# Patient Record
Sex: MALE | Race: WHITE | NOT HISPANIC OR LATINO | Employment: OTHER | URBAN - METROPOLITAN AREA
[De-identification: names, ages, dates, MRNs, and addresses within clinical notes are randomized per-mention and may not be internally consistent; named-entity substitution may affect disease eponyms.]

---

## 2018-06-04 ENCOUNTER — CONVERSION ENCOUNTER (OUTPATIENT)
Dept: PODIATRY | Facility: CLINIC | Age: 53
End: 2018-06-04

## 2018-06-04 ENCOUNTER — OFFICE VISIT CONVERTED (OUTPATIENT)
Dept: PODIATRY | Facility: CLINIC | Age: 53
End: 2018-06-04
Attending: PODIATRIST

## 2019-08-05 ENCOUNTER — CONVERSION ENCOUNTER (OUTPATIENT)
Dept: FAMILY MEDICINE CLINIC | Facility: CLINIC | Age: 54
End: 2019-08-05

## 2019-08-05 ENCOUNTER — OFFICE VISIT CONVERTED (OUTPATIENT)
Dept: FAMILY MEDICINE CLINIC | Facility: CLINIC | Age: 54
End: 2019-08-05
Attending: FAMILY MEDICINE

## 2019-08-05 ENCOUNTER — HOSPITAL ENCOUNTER (OUTPATIENT)
Dept: FAMILY MEDICINE CLINIC | Facility: CLINIC | Age: 54
Discharge: HOME | End: 2019-08-05
Attending: FAMILY MEDICINE

## 2019-08-05 LAB
ALBUMIN SERPL-MCNC: 3.7 G/DL (ref 3.5–5)
ALBUMIN/GLOB SERPL: 1.3 {RATIO} (ref 1.4–2.6)
ALP SERPL-CCNC: 99 U/L (ref 56–119)
ALT SERPL-CCNC: 26 U/L (ref 10–40)
ANION GAP SERPL CALC-SCNC: 17 MMOL/L (ref 8–19)
APPEARANCE UR: ABNORMAL
AST SERPL-CCNC: 39 U/L (ref 15–50)
BASOPHILS # BLD AUTO: 0.05 10*3/UL (ref 0–0.2)
BASOPHILS NFR BLD AUTO: 0.7 % (ref 0–3)
BILIRUB SERPL-MCNC: 2.29 MG/DL (ref 0.2–1.3)
BILIRUB UR QL: ABNORMAL
BUN SERPL-MCNC: 7 MG/DL (ref 5–25)
BUN/CREAT SERPL: 7 {RATIO} (ref 6–20)
CALCIUM SERPL-MCNC: 9.9 MG/DL (ref 8.7–10.4)
CASTS URNS QL MICRO: ABNORMAL /[LPF]
CHLORIDE SERPL-SCNC: 105 MMOL/L (ref 99–111)
COLOR UR: ABNORMAL
CONV ABS IMM GRAN: 0.02 10*3/UL (ref 0–0.2)
CONV CO2: 25 MMOL/L (ref 22–32)
CONV COLLECTION SOURCE (UA): ABNORMAL
CONV HYALINE CASTS IN URINE MICRO: ABNORMAL /[LPF]
CONV IMMATURE GRAN: 0.3 % (ref 0–1.8)
CONV TOTAL PROTEIN: 6.6 G/DL (ref 6.3–8.2)
CONV UROBILINOGEN IN URINE BY AUTOMATED TEST STRIP: 4 {EHRLICHU}/DL (ref 0.1–1)
CREAT UR-MCNC: 1.02 MG/DL (ref 0.7–1.2)
DEPRECATED RDW RBC AUTO: 55.4 FL (ref 35.1–43.9)
EOSINOPHIL # BLD AUTO: 0.1 10*3/UL (ref 0–0.7)
EOSINOPHIL # BLD AUTO: 1.5 % (ref 0–7)
ERYTHROCYTE [DISTWIDTH] IN BLOOD BY AUTOMATED COUNT: 14.3 % (ref 11.6–14.4)
FOLATE SERPL-MCNC: 5.9 NG/ML (ref 4.8–20)
GFR SERPLBLD BASED ON 1.73 SQ M-ARVRAT: >60 ML/MIN/{1.73_M2}
GLOBULIN UR ELPH-MCNC: 2.9 G/DL (ref 2–3.5)
GLUCOSE SERPL-MCNC: 82 MG/DL (ref 70–99)
GLUCOSE UR QL: NEGATIVE MG/DL
HCT VFR BLD AUTO: 41 % (ref 42–52)
HGB BLD-MCNC: 13.5 G/DL (ref 14–18)
HGB UR QL STRIP: ABNORMAL
IRON SATN MFR SERPL: 68 % (ref 20–55)
IRON SERPL-MCNC: 195 UG/DL (ref 70–180)
KETONES UR QL STRIP: NEGATIVE MG/DL
LEUKOCYTE ESTERASE UR QL STRIP: ABNORMAL
LYMPHOCYTES # BLD AUTO: 1.8 10*3/UL (ref 1–5)
LYMPHOCYTES NFR BLD AUTO: 26.5 % (ref 20–45)
MCH RBC QN AUTO: 34.6 PG (ref 27–31)
MCHC RBC AUTO-ENTMCNC: 32.9 G/DL (ref 33–37)
MCV RBC AUTO: 105.1 FL (ref 80–96)
MONOCYTES # BLD AUTO: 0.58 10*3/UL (ref 0.2–1.2)
MONOCYTES NFR BLD AUTO: 8.5 % (ref 3–10)
NEUTROPHILS # BLD AUTO: 4.25 10*3/UL (ref 2–8)
NEUTROPHILS NFR BLD AUTO: 62.5 % (ref 30–85)
NITRITE UR QL STRIP: POSITIVE
NRBC CBCN: 0 % (ref 0–0.7)
OSMOLALITY SERPL CALC.SUM OF ELEC: 293 MOSM/KG (ref 273–304)
PH UR STRIP.AUTO: 6 [PH] (ref 5–8)
PLATELET # BLD AUTO: 133 10*3/UL (ref 130–400)
PMV BLD AUTO: 11.1 FL (ref 9.4–12.4)
POTASSIUM SERPL-SCNC: 4.3 MMOL/L (ref 3.5–5.3)
PROT UR QL: NEGATIVE MG/DL
RBC # BLD AUTO: 3.9 10*6/UL (ref 4.7–6.1)
RBC #/AREA URNS HPF: ABNORMAL /[HPF]
SODIUM SERPL-SCNC: 143 MMOL/L (ref 135–147)
SP GR UR: 1.03 (ref 1–1.03)
T4 FREE SERPL-MCNC: 1.2 NG/DL (ref 0.9–1.8)
TIBC SERPL-MCNC: 287 UG/DL (ref 245–450)
TRANSFERRIN SERPL-MCNC: 201 MG/DL (ref 215–365)
TSH SERPL-ACNC: 0.69 M[IU]/L (ref 0.27–4.2)
VIT B12 SERPL-MCNC: 842 PG/ML (ref 211–911)
WBC # BLD AUTO: 6.8 10*3/UL (ref 4.8–10.8)
WBC #/AREA URNS HPF: ABNORMAL /[HPF]

## 2019-08-08 LAB
BACTERIA UR CULT: NORMAL
CONV EBV EARLY ANTIGEN: 26 U/ML (ref 0–10.9)
CONV EBV NUCLEAR ANTIGEN: >600 U/ML (ref 0–21.9)
CONV EPSTEIN BARR VIRAL CAPSID IGM: <10 U/ML (ref 0–43.9)
CONV EPSTEIN BARR VIRUS ANTIBODY TO VIRAL CAPSID IGG: 544 U/ML (ref 0–21.9)

## 2019-08-12 ENCOUNTER — HOSPITAL ENCOUNTER (OUTPATIENT)
Dept: FAMILY MEDICINE CLINIC | Facility: CLINIC | Age: 54
Discharge: HOME | End: 2019-08-12
Attending: FAMILY MEDICINE

## 2019-08-12 ENCOUNTER — CONVERSION ENCOUNTER (OUTPATIENT)
Dept: SURGERY | Facility: CLINIC | Age: 54
End: 2019-08-12

## 2019-08-12 ENCOUNTER — CONVERSION ENCOUNTER (OUTPATIENT)
Dept: FAMILY MEDICINE CLINIC | Facility: CLINIC | Age: 54
End: 2019-08-12

## 2019-08-12 ENCOUNTER — OFFICE VISIT CONVERTED (OUTPATIENT)
Dept: FAMILY MEDICINE CLINIC | Facility: CLINIC | Age: 54
End: 2019-08-12
Attending: FAMILY MEDICINE

## 2019-08-12 ENCOUNTER — OFFICE VISIT CONVERTED (OUTPATIENT)
Dept: SURGERY | Facility: CLINIC | Age: 54
End: 2019-08-12
Attending: PHYSICIAN ASSISTANT

## 2019-08-12 LAB
ALBUMIN SERPL-MCNC: 3.4 G/DL (ref 3.5–5)
ALBUMIN/GLOB SERPL: 1.1 {RATIO} (ref 1.4–2.6)
ALP SERPL-CCNC: 132 U/L (ref 56–119)
ALT SERPL-CCNC: 25 U/L (ref 10–40)
ANION GAP SERPL CALC-SCNC: 15 MMOL/L (ref 8–19)
AST SERPL-CCNC: 35 U/L (ref 15–50)
BILIRUB SERPL-MCNC: 1.66 MG/DL (ref 0.2–1.3)
BUN SERPL-MCNC: 7 MG/DL (ref 5–25)
BUN/CREAT SERPL: 7 {RATIO} (ref 6–20)
CALCIUM SERPL-MCNC: 10 MG/DL (ref 8.7–10.4)
CHLORIDE SERPL-SCNC: 108 MMOL/L (ref 99–111)
CONV CO2: 23 MMOL/L (ref 22–32)
CONV TOTAL PROTEIN: 6.4 G/DL (ref 6.3–8.2)
CREAT UR-MCNC: 1 MG/DL (ref 0.7–1.2)
GFR SERPLBLD BASED ON 1.73 SQ M-ARVRAT: >60 ML/MIN/{1.73_M2}
GLOBULIN UR ELPH-MCNC: 3 G/DL (ref 2–3.5)
GLUCOSE SERPL-MCNC: 73 MG/DL (ref 70–99)
IRON SATN MFR SERPL: 73 % (ref 20–55)
IRON SERPL-MCNC: 201 UG/DL (ref 70–180)
OSMOLALITY SERPL CALC.SUM OF ELEC: 289 MOSM/KG (ref 273–304)
POTASSIUM SERPL-SCNC: 5.1 MMOL/L (ref 3.5–5.3)
SODIUM SERPL-SCNC: 141 MMOL/L (ref 135–147)
TIBC SERPL-MCNC: 276 UG/DL (ref 245–450)
TRANSFERRIN SERPL-MCNC: 193 MG/DL (ref 215–365)

## 2019-08-15 ENCOUNTER — HOSPITAL ENCOUNTER (OUTPATIENT)
Dept: OTHER | Facility: HOSPITAL | Age: 54
Discharge: HOME | End: 2019-08-15
Attending: FAMILY MEDICINE

## 2019-08-27 ENCOUNTER — HOSPITAL ENCOUNTER (OUTPATIENT)
Dept: FAMILY MEDICINE CLINIC | Facility: CLINIC | Age: 54
Discharge: HOME | End: 2019-08-27
Attending: FAMILY MEDICINE

## 2019-08-27 ENCOUNTER — OFFICE VISIT CONVERTED (OUTPATIENT)
Dept: FAMILY MEDICINE CLINIC | Facility: CLINIC | Age: 54
End: 2019-08-27
Attending: FAMILY MEDICINE

## 2019-08-27 LAB
FERRITIN SERPL-MCNC: 236 NG/ML (ref 30–300)
IRON SATN MFR SERPL: 65 % (ref 20–55)
IRON SERPL-MCNC: 187 UG/DL (ref 70–180)
TIBC SERPL-MCNC: 289 UG/DL (ref 245–450)
TRANSFERRIN SERPL-MCNC: 202 MG/DL (ref 215–365)

## 2019-09-06 ENCOUNTER — PROCEDURE VISIT CONVERTED (OUTPATIENT)
Dept: UROLOGY | Facility: CLINIC | Age: 54
End: 2019-09-06
Attending: UROLOGY

## 2020-02-05 ENCOUNTER — HOSPITAL ENCOUNTER (OUTPATIENT)
Dept: OTHER | Facility: HOSPITAL | Age: 55
Discharge: HOME | End: 2020-02-05
Attending: INTERNAL MEDICINE

## 2020-03-13 ENCOUNTER — OFFICE VISIT CONVERTED (OUTPATIENT)
Dept: FAMILY MEDICINE CLINIC | Facility: CLINIC | Age: 55
End: 2020-03-13
Attending: NURSE PRACTITIONER

## 2020-07-02 ENCOUNTER — HOSPITAL ENCOUNTER (OUTPATIENT)
Dept: OTHER | Facility: HOSPITAL | Age: 55
Discharge: HOME | End: 2020-07-02
Attending: ORTHOPAEDIC SURGERY

## 2020-07-02 ENCOUNTER — OFFICE VISIT CONVERTED (OUTPATIENT)
Dept: ORTHOPEDIC SURGERY | Facility: CLINIC | Age: 55
End: 2020-07-02
Attending: ORTHOPAEDIC SURGERY

## 2020-07-02 LAB
APPEARANCE FLD: NORMAL
COLOR AMN: NORMAL
CRYSTALS FLD MICRO: NORMAL
LYMPHOCYTES NFR FLD MANUAL: 9 %
MACROPHAGE FLUID: 0 /100{WBCS}
MONOCYTES NFR FLD: 22 %
NEUTROPHILS NFR FLD MANUAL: 69 %
RBC # FLD AUTO: NORMAL /UL
SPECIMEN SOURCE: NORMAL
WBC # FLD AUTO: 100 /UL

## 2020-07-04 LAB — BACTERIA SPEC ANAEROBE CULT: NORMAL

## 2020-07-05 LAB — BACTERIA SPEC AEROBE CULT: NORMAL

## 2020-07-24 ENCOUNTER — HOSPITAL ENCOUNTER (OUTPATIENT)
Dept: URGENT CARE | Facility: CLINIC | Age: 55
Discharge: HOME | End: 2020-07-24

## 2020-07-30 ENCOUNTER — CONVERSION ENCOUNTER (OUTPATIENT)
Dept: ORTHOPEDIC SURGERY | Facility: CLINIC | Age: 55
End: 2020-07-30

## 2020-07-30 ENCOUNTER — OFFICE VISIT CONVERTED (OUTPATIENT)
Dept: ORTHOPEDIC SURGERY | Facility: CLINIC | Age: 55
End: 2020-07-30
Attending: PHYSICIAN ASSISTANT

## 2020-08-05 ENCOUNTER — HOSPITAL ENCOUNTER (OUTPATIENT)
Dept: OTHER | Facility: HOSPITAL | Age: 55
Discharge: HOME | End: 2020-08-05
Attending: PHYSICIAN ASSISTANT

## 2020-08-13 ENCOUNTER — OFFICE VISIT CONVERTED (OUTPATIENT)
Dept: ORTHOPEDIC SURGERY | Facility: CLINIC | Age: 55
End: 2020-08-13
Attending: ORTHOPAEDIC SURGERY

## 2020-08-19 ENCOUNTER — HOSPITAL ENCOUNTER (OUTPATIENT)
Dept: FAMILY MEDICINE CLINIC | Facility: CLINIC | Age: 55
Discharge: HOME | End: 2020-08-19
Attending: PHYSICIAN ASSISTANT

## 2020-08-21 LAB — SARS-COV-2 RNA SPEC QL NAA+PROBE: NOT DETECTED

## 2020-08-24 ENCOUNTER — HOSPITAL ENCOUNTER (OUTPATIENT)
Dept: PERIOP | Facility: HOSPITAL | Age: 55
Setting detail: HOSPITAL OUTPATIENT SURGERY
Discharge: HOME | End: 2020-08-24
Attending: ORTHOPAEDIC SURGERY

## 2020-08-24 LAB
ALBUMIN SERPL-MCNC: 3.4 G/DL (ref 3.5–5)
ALBUMIN/GLOB SERPL: 1.1 {RATIO} (ref 1.4–2.6)
ALP SERPL-CCNC: 119 U/L (ref 56–119)
ALT SERPL-CCNC: 19 U/L (ref 10–40)
ANION GAP SERPL CALC-SCNC: 11 MMOL/L (ref 8–19)
APTT BLD: 28.4 S (ref 22.2–34.2)
AST SERPL-CCNC: 34 U/L (ref 15–50)
BASOPHILS # BLD AUTO: 0.05 10*3/UL (ref 0–0.2)
BASOPHILS NFR BLD AUTO: 1.1 % (ref 0–3)
BILIRUB SERPL-MCNC: 1.72 MG/DL (ref 0.2–1.3)
BUN SERPL-MCNC: 7 MG/DL (ref 5–25)
BUN/CREAT SERPL: 6 {RATIO} (ref 6–20)
CALCIUM SERPL-MCNC: 10.3 MG/DL (ref 8.7–10.4)
CHLORIDE SERPL-SCNC: 107 MMOL/L (ref 99–111)
CONV ABS IMM GRAN: 0 10*3/UL (ref 0–0.2)
CONV CO2: 26 MMOL/L (ref 22–32)
CONV IMMATURE GRAN: 0 % (ref 0–1.8)
CONV TOTAL PROTEIN: 6.5 G/DL (ref 6.3–8.2)
CREAT UR-MCNC: 1.08 MG/DL (ref 0.7–1.2)
DEPRECATED RDW RBC AUTO: 49.8 FL (ref 35.1–43.9)
EOSINOPHIL # BLD AUTO: 0.27 10*3/UL (ref 0–0.7)
EOSINOPHIL # BLD AUTO: 5.7 % (ref 0–7)
ERYTHROCYTE [DISTWIDTH] IN BLOOD BY AUTOMATED COUNT: 13.7 % (ref 11.6–14.4)
GFR SERPLBLD BASED ON 1.73 SQ M-ARVRAT: >60 ML/MIN/{1.73_M2}
GLOBULIN UR ELPH-MCNC: 3.1 G/DL (ref 2–3.5)
GLUCOSE SERPL-MCNC: 88 MG/DL (ref 70–99)
HCT VFR BLD AUTO: 41.1 % (ref 42–52)
HGB BLD-MCNC: 14 G/DL (ref 14–18)
INR PPP: 1.16 (ref 2–3)
LYMPHOCYTES # BLD AUTO: 1.75 10*3/UL (ref 1–5)
LYMPHOCYTES NFR BLD AUTO: 36.8 % (ref 20–45)
MCH RBC QN AUTO: 33.8 PG (ref 27–31)
MCHC RBC AUTO-ENTMCNC: 34.1 G/DL (ref 33–37)
MCV RBC AUTO: 99.3 FL (ref 80–96)
MONOCYTES # BLD AUTO: 0.45 10*3/UL (ref 0.2–1.2)
MONOCYTES NFR BLD AUTO: 9.5 % (ref 3–10)
NEUTROPHILS # BLD AUTO: 2.24 10*3/UL (ref 2–8)
NEUTROPHILS NFR BLD AUTO: 46.9 % (ref 30–85)
NRBC CBCN: 0 % (ref 0–0.7)
OSMOLALITY SERPL CALC.SUM OF ELEC: 287 MOSM/KG (ref 273–304)
PLATELET # BLD AUTO: 108 10*3/UL (ref 130–400)
PMV BLD AUTO: 10.6 FL (ref 9.4–12.4)
POTASSIUM SERPL-SCNC: 4 MMOL/L (ref 3.5–5.3)
PROTHROMBIN TIME: 12.1 S (ref 9.4–12)
RBC # BLD AUTO: 4.14 10*6/UL (ref 4.7–6.1)
SODIUM SERPL-SCNC: 140 MMOL/L (ref 135–147)
WBC # BLD AUTO: 4.76 10*3/UL (ref 4.8–10.8)

## 2020-09-08 ENCOUNTER — OFFICE VISIT CONVERTED (OUTPATIENT)
Dept: ORTHOPEDIC SURGERY | Facility: CLINIC | Age: 55
End: 2020-09-08
Attending: PHYSICIAN ASSISTANT

## 2020-12-16 ENCOUNTER — OFFICE VISIT CONVERTED (OUTPATIENT)
Dept: FAMILY MEDICINE CLINIC | Facility: CLINIC | Age: 55
End: 2020-12-16
Attending: NURSE PRACTITIONER

## 2021-05-09 VITALS
SYSTOLIC BLOOD PRESSURE: 96 MMHG | OXYGEN SATURATION: 99 % | DIASTOLIC BLOOD PRESSURE: 54 MMHG | TEMPERATURE: 97.4 F | HEART RATE: 84 BPM | HEIGHT: 69 IN | BODY MASS INDEX: 27.44 KG/M2 | WEIGHT: 185.25 LBS

## 2021-05-09 VITALS
TEMPERATURE: 97.3 F | HEART RATE: 75 BPM | SYSTOLIC BLOOD PRESSURE: 102 MMHG | OXYGEN SATURATION: 98 % | HEIGHT: 69 IN | WEIGHT: 189 LBS | BODY MASS INDEX: 27.99 KG/M2 | DIASTOLIC BLOOD PRESSURE: 54 MMHG

## 2021-05-09 VITALS
DIASTOLIC BLOOD PRESSURE: 78 MMHG | WEIGHT: 185.25 LBS | HEIGHT: 69 IN | OXYGEN SATURATION: 98 % | SYSTOLIC BLOOD PRESSURE: 124 MMHG | HEART RATE: 75 BPM | TEMPERATURE: 97.2 F | BODY MASS INDEX: 27.44 KG/M2

## 2021-05-09 VITALS
BODY MASS INDEX: 30.55 KG/M2 | HEART RATE: 103 BPM | WEIGHT: 206.25 LBS | OXYGEN SATURATION: 98 % | HEIGHT: 69 IN | SYSTOLIC BLOOD PRESSURE: 120 MMHG | TEMPERATURE: 97.3 F | DIASTOLIC BLOOD PRESSURE: 62 MMHG

## 2021-05-09 VITALS
HEIGHT: 69 IN | OXYGEN SATURATION: 95 % | HEART RATE: 88 BPM | DIASTOLIC BLOOD PRESSURE: 70 MMHG | BODY MASS INDEX: 33.03 KG/M2 | WEIGHT: 223 LBS | TEMPERATURE: 97.7 F | SYSTOLIC BLOOD PRESSURE: 120 MMHG

## 2021-05-10 NOTE — H&P
"   History and Physical      Patient Name: Richard Patton   Patient ID: [id number]   Sex: Male   YOB: 1965    Primary Care Provider: Lev Bautista MD   Referring Provider: Diego Fallon MD    Visit Date: July 2, 2020    Provider: Michoacano Mesa MD   Location: [facility]   Location Address: [address] [place] [address]  [id number]   Location Phone: [phone]          Chief Complaint  · Left knee pain      History Of Present Illness  Richard Patton is a 55 year old /White male who presents today to [facility].      The patient presents today for left knee pain evaluation. The patient was busting wood when his knee all of a sudden \" gave out\" on him. He states you could hear the injury happen. He previously had knee surgery 20 years ago.-ACL reconstruction. He is on tramadol.                Past Medical History  Cirrhosis of liver; Foot pain, bilateral; Hematuria; Neuropathy; Numbness in feet         Past Surgical History  Appendectomy; Cataract surgery; Knee surgery         Medication List  nadolol 40 mg oral tablet; Ultram 50 mg oral tablet; Xifaxan 550 mg oral tablet         Allergy List  NO KNOWN DRUG ALLERGIES; NO KNOWN DRUG ALLERGIES         Family Medical History  Heart Disease; Diabetes, unspecified type         Social History  Alcohol (Former); Alcohol Use (Never); lives with spouse; .; Recreational Drug Use (Never); Retired.; Tobacco (Former)         Review of Systems  · Constitutional  o Denies  o : fever, chills, weight loss  · Cardiovascular  o Denies  o : chest pain, shortness of breath  · Gastrointestinal  o Denies  o : liver disease, heartburn, nausea, blood in stools  · Genitourinary  o Denies  o : painful urination, blood in urine  · Integument  o Denies  o : rash, itching  · Neurologic  o Denies  o : headache, weakness, loss of consciousness  · Musculoskeletal  o Denies  o : painful, swollen joints  · Psychiatric  o Denies  o : " "drug/alcohol addiction, anxiety, depression      Vitals  Date Time BP Position Site L\R Cuff Size HR RR TEMP (F) WT  HT  BMI kg/m2 BSA m2 O2 Sat HC       07/02/2020 12:49 PM         190lbs 0oz 5'  9.5\" 27.66 2.06           Physical Examination  · Constitutional  o Appearance  o : well developed, well-nourished, no obvious deformities present  · Head and Face  o Head  o :   § Inspection  § : normocephalic  o Face  o :   § Inspection  § : no facial lesions  · Eyes  o Conjunctivae  o : conjunctivae normal  o Sclerae  o : sclerae white  · Ears, Nose, Mouth and Throat  o Ears  o :   § External Ears  § : appearance within normal limits  § Hearing  § : intact  o Nose  o :   § External Nose  § : appearance normal  · Neck  o Inspection/Palpation  o : normal appearance  o Range of Motion  o : full range of motion  · Respiratory  o Respiratory Effort  o : breathing unlabored  o Inspection of Chest  o : normal appearance  o Auscultation of Lungs  o : no audible wheezing or rales  · Cardiovascular  o Heart  o : regular rate  · Gastrointestinal  o Abdominal Examination  o : soft and non-tender  · Skin and Subcutaneous Tissue  o General Inspection  o : intact, no rashes  · Psychiatric  o General  o : Alert and oriented x3  o Judgement and Insight  o : judgment and insight intact  o Mood and Affect  o : mood normal, affect appropriate  · Left Knee  o Inspection  o : No tenderness to the patella, tenderness to the medial joint line. E:-15 F:90 Knee is stable, no redness, positive effusion.   · Injection Note/Aspiration Note  o Site  o : left knee  o Procedure  o : Procedure: After educating the patient, patient gave consent for procedure. After using Chloraprep, the joint space aspirated and then injected. The patient tolerated the procedure well.  o Medication  o : 80 mg of DepoMedrol with 1cc of 1% Lidocaine  · Imaging  o Imaging  o : 1. Linear lucency through the superolateral aspect of the patella likely representing a normal " variant bipartite patella or less likely fracture given the lack of trauma and lack of joint effusion. 2. Evidence of prior ACL reconstruction. 3. Elongation of the inferior pole of the patella likely related to patellar tendinosis.               Assessment  · Primary osteoarthritis of left knee     715.16/M17.12  · Left knee pain, unspecified chronicity     719.46/M25.562  · Effusion, left knee     719.06/M25.462      Plan  · Orders  o Depo-Medrol injection 80mg () - - 07/02/2020   Lot [id number] Exp 06 2021 Teva Administered by DEVIN PINEDA MD  o Knee Intra-articular Injection without US Guidance [facility] (85523) - - 07/02/2020   Lot [id number] Exp 07 01 2021 Hospira Administered by DEVIN PINEDA MD   o Culture and sensitivity of specimen (32290) - 719.06/M25.462 - 07/02/2020  o Cell count synovial fluid (78461) - 719.06/M25.462 - 07/02/2020  o Gram stain (07176) - 719.06/M25.462 - 07/02/2020  o Crystals body fld (50034) - 719.06/M25.462 - 07/02/2020  o Culture aerobic + anaerobic (82835) - 719.06/M25.462 - 07/02/2020  · Medications  o Medications have been Reconciled  o Transition of Care or Provider Policy  · Instructions  o Dr. Pineda saw and examined the patient and agrees with plan.   o Reviewed the patient's Past Medical, Social, and Family history as well as the ROS at today's visit, no changes.  o Call or return if worsening symptoms.  o The above service was scribed by Anderson Abel on my behalf and I attest to the accuracy of the note. jsb  o We discussed the plan with the patient, as well as treatment options. Plan for aspiration and cortisone shot today. If symptoms do not improve we will do a MRI. The patient tolerated this well today. **50 cc cloudy fluid            Electronically Signed by: Steven Aebl - , Other -Author on July 8, 2020 11:04:55 AM  Electronically Co-signed by: Devin Pineda MD -Reviewer on July 8, 2020 08:53:58 PM

## 2021-05-13 NOTE — PROGRESS NOTES
Progress Note      Patient Name: Richard Patton   Patient ID: [id number]   Sex: Male   YOB: 1965    Primary Care Provider: Lev Bautista MD   Referring Provider: Diego Fallon MD    Visit Date: September 8, 2020    Provider: Ovidio Sapp PA-C   Location: [facility]   Location Address: [address] [place] [address]  [id number]   Location Phone: [phone]          Chief Complaint  · Left knee pain      History Of Present Illness  Richard Patton is a 55 year old /White male who presents today to [facility]. Patient presents for two-week postoperative evaluation of left knee arthroscopic partial medial and partial lateral meniscectomy, chondroplasty, 8/24/2020. Patient states his knee feels good but states he still has some pain in the knee, patient took the pain medication states he still having some pain at night to sleep he is requesting refill. Patient states that the surgery has helped his knee and he feels benefit from the surgery. Patient has been doing home exercises and walking with good results.       Past Medical History  Cirrhosis of liver; Foot pain, bilateral; Hematuria; Neuropathy; Numbness in feet         Past Surgical History  Appendectomy; Cataract surgery; Knee surgery         Medication List  nadolol 40 mg oral tablet; Xifaxan 550 mg oral tablet         Allergy List  NO KNOWN DRUG ALLERGIES; NO KNOWN DRUG ALLERGIES       Allergies Reconciled  Family Medical History  Heart Disease; Diabetes, unspecified type         Social History  Alcohol (Former); Alcohol Use (Never); lives with spouse; .; Recreational Drug Use (Never); Retired.; Tobacco (Former)         Review of Systems  · Constitutional  o Denies  o : fever, chills, weight loss  · Cardiovascular  o Denies  o : chest pain, shortness of breath  · Gastrointestinal  o Denies  o : liver disease, heartburn, nausea, blood in stools  · Genitourinary  o Denies  o : painful  "urination, blood in urine  · Integument  o Denies  o : rash, itching  · Neurologic  o Denies  o : headache, weakness, loss of consciousness  · Musculoskeletal  o Denies  o : painful, swollen joints  · Psychiatric  o Denies  o : drug/alcohol addiction, anxiety, depression      Vitals  Date Time BP Position Site L\R Cuff Size HR RR TEMP (F) WT  HT  BMI kg/m2 BSA m2 O2 Sat        09/08/2020 10:10 AM      83 - R   222lbs 0oz 5'  9.5\" 32.31 2.22 98 %          Physical Examination  · Constitutional  o Appearance  o : well developed, well-nourished, no obvious deformities present  · Head and Face  o Head  o :   § Inspection  § : normocephalic  o Face  o :   § Inspection  § : no facial lesions  · Eyes  o Conjunctivae  o : conjunctivae normal  o Sclerae  o : sclerae white  · Ears, Nose, Mouth and Throat  o Ears  o :   § External Ears  § : appearance within normal limits  § Hearing  § : intact  o Nose  o :   § External Nose  § : appearance normal  · Neck  o Inspection/Palpation  o : normal appearance  o Range of Motion  o : full range of motion  · Respiratory  o Respiratory Effort  o : breathing unlabored  o Inspection of Chest  o : normal appearance  o Auscultation of Lungs  o : no audible wheezing or rales  · Cardiovascular  o Heart  o : regular rate  · Gastrointestinal  o Abdominal Examination  o : soft and non-tender  · Skin and Subcutaneous Tissue  o General Inspection  o : intact, no rashes  · Psychiatric  o General  o : Alert and oriented x3  o Judgement and Insight  o : judgment and insight intact  o Mood and Affect  o : mood normal, affect appropriate  · Left Knee  o Inspection  o : Incisions are healing well, no erythema, no ecchymosis, no drainage, no swelling, no signs of infection. Full extension, flexion 120, stable to anterior/posterior drawer, stable to varus/valgus stress, negative Homans sign, nontender calf, neurovascularly intact          Assessment  · Aftercare following surgery of left knee " arthroscopic partial medial and partial lateral meniscectomy, chondroplasty, 8/24/2020     V54.81  · Left knee pain, unspecified chronicity     719.46/M25.562      Plan  · Medications  o Medications have been Reconciled  o Transition of Care or Provider Policy  · Instructions  o Reviewed the patient's Past Medical, Social, and Family history as well as the ROS at today's visit, no changes.  o Call or return if worsening symptoms.  o Follow Up in 4 weeks.   o Reviewed operative images with the patient, advised him to continue home exercises and he was given a new printout, patient was given refill of pain medication Norco 5 mg. Follow-up in 4 weeks for reevaluation.            Electronically Signed by: JUAN Serrano-OMKAR -Author on September 8, 2020 11:27:41 AM  Electronically Co-signed by: Michoacano Mesa MD -Reviewer on September 8, 2020 08:13:16 PM

## 2021-05-13 NOTE — PROGRESS NOTES
Progress Note      Patient Name: Richard Patton   Patient ID: [id number]   Sex: Male   YOB: 1965    Primary Care Provider: Lev Bautista MD   Referring Provider: Diego Fallon MD    Visit Date: August 13, 2020    Provider: Michoacano Mesa MD   Location: [facility]   Location Address: [address] [place] [address]  [id number]   Location Phone: [phone]          Chief Complaint  · Left knee pain       History Of Present Illness  Richard Patton is a 55 year old /White male who presents today to [facility].      The patient presents today for evaluation after MRI of the left knee. He has liver problems, but is otherwise healthy to his knowledge. He has trouble standing on his knee.              Past Medical History  Cirrhosis of liver; Foot pain, bilateral; Hematuria; Neuropathy; Numbness in feet         Past Surgical History  Appendectomy; Cataract surgery; Knee surgery         Medication List  nadolol 40 mg oral tablet; Ultram 50 mg oral tablet; Xifaxan 550 mg oral tablet         Allergy List  NO KNOWN DRUG ALLERGIES; NO KNOWN DRUG ALLERGIES         Family Medical History  Heart Disease; Diabetes, unspecified type         Social History  Alcohol (Former); Alcohol Use (Never); lives with spouse; .; Recreational Drug Use (Never); Retired.; Tobacco (Former)         Review of Systems  · Constitutional  o Denies  o : fever, chills, weight loss  · Cardiovascular  o Denies  o : chest pain, shortness of breath  · Gastrointestinal  o Denies  o : liver disease, heartburn, nausea, blood in stools  · Genitourinary  o Denies  o : painful urination, blood in urine  · Integument  o Denies  o : rash, itching  · Neurologic  o Denies  o : headache, weakness, loss of consciousness  · Musculoskeletal  o Denies  o : painful, swollen joints  · Psychiatric  o Denies  o : drug/alcohol addiction, anxiety, depression      Vitals  Date Time BP Position Site L\R Cuff Size HR RR  "TEMP (F) WT  HT  BMI kg/m2 BSA m2 O2 Sat        08/13/2020 08:48 AM      82 - R   217lbs 8oz 5'  9.5\" 31.66 2.2 98 %          Physical Examination  · Constitutional  o Appearance  o : well developed, well-nourished, no obvious deformities present  · Head and Face  o Head  o :   § Inspection  § : normocephalic  o Face  o :   § Inspection  § : no facial lesions  · Eyes  o Conjunctivae  o : conjunctivae normal  o Sclerae  o : sclerae white  · Ears, Nose, Mouth and Throat  o Ears  o :   § External Ears  § : appearance within normal limits  § Hearing  § : intact  o Nose  o :   § External Nose  § : appearance normal  · Neck  o Inspection/Palpation  o : normal appearance  o Range of Motion  o : full range of motion  · Respiratory  o Respiratory Effort  o : breathing unlabored  o Inspection of Chest  o : normal appearance  o Auscultation of Lungs  o : no audible wheezing or rales  · Cardiovascular  o Heart  o : regular rate  · Gastrointestinal  o Abdominal Examination  o : soft and non-tender  · Skin and Subcutaneous Tissue  o General Inspection  o : intact, no rashes  · Psychiatric  o General  o : Alert and oriented x3  o Judgement and Insight  o : judgment and insight intact  o Mood and Affect  o : mood normal, affect appropriate  · Left Knee  o Inspection  o : Tenderness palpation of the anterior knee, mild swelling to the knee, no red's, no heat, no signs of infection. Extension -5, flexion 120, stable anterior/posterior drawer, stable to varus/valgus stress. Pain with range of motion.   · Imaging  o Imaging  o : MRI: Postoperative changes related to the prior anterior cruciate ligament repair. The ACL graft is intact. There is a large bucket-handle type tear involving the entirety of the medial meniscus. A large displaced fragment extends torwards the intercondylar notch at the inner aspect of the medial compartment. Evidence for horizontal type meniscal tear involving the posterior horn and body segment of the " lateral meniscus extending through the inferior articular surface. Moderate to advanced tri-compartmental osteoarthritis.               Assessment  · Primary osteoarthritis of left knee     715.16/M17.12  · MMT (medial meniscus tear)     836.0/S83.249A  · Left knee pain, unspecified chronicity     719.46/M25.562      Plan  · Medications  o Medications have been Reconciled  o Transition of Care or Provider Policy  · Instructions  o Dr. Mesa saw and examined the patient and agrees with plan.   o Reviewed the patient's Past Medical, Social, and Family history as well as the ROS at today's visit, no changes.  o Call or return if worsening symptoms.  o Discussed surgery.  o Risks/benefits discussed with patient including, but not limited to: infection, bleeding, neurovascular damage, malunion, nonunion, aesthetic deformity, need for further surgery, and death.  o Surgery pamphlet given.  o The above service was scribed by Anderson Abel on my behalf and I attest to the accuracy of the note. jsb  o We discussed surgery with the patient, we discussed that arthroscopic surgery would be the best option for his MMT. We discussed risks and benefit to surgery as well as rehab and recovery and what to expect. Plan for Partial medial and lateral menisectomy and chondroplasty,  o Electronically Identified Patient Education Materials Provided Electronically            Electronically Signed by: Steven Abel - , Other -Author on August 13, 2020 12:57:03 PM  Electronically Co-signed by: Michoacano Mesa MD -Reviewer on August 13, 2020 09:13:52 PM

## 2021-05-13 NOTE — PROGRESS NOTES
Progress Note      Patient Name: Richard Patton   Patient ID: [id number]   Sex: Male   YOB: 1965    Primary Care Provider: Lev Bautista MD   Referring Provider: Diego Fallon MD    Visit Date: July 30, 2020    Provider: Ovidio Sapp PA-C   Location: [facility]   Location Address: [address] [place] [address]  [id number]   Location Phone: [phone]          Chief Complaint  · Left knee pain       History Of Present Illness  Richard Patton is a 55 year old /White male who presents today to [facility]. Patient presents for follow-up evaluation of left knee pain. Patient was evaluated by Dr. Mesa on 7/2/2020 he had an aspiration injection which he states did not help his knee pain he states his knee pain continues to bother him, worse at night and with activity, he states the pain is in the anterior knee he states he has pain with bending and straightening the knee. Patient has a history of left ACL surgery 20 years ago, patient states the tramadol he was given at last visit helped his pain he is requesting a refill. Patient has not had MRI of the left knee. Patient denies buckling catching or locking of the knee.       Past Medical History  Cirrhosis of liver; Foot pain, bilateral; Hematuria; Neuropathy; Numbness in feet         Past Surgical History  Appendectomy; Cataract surgery; Knee surgery         Medication List  nadolol 40 mg oral tablet; Ultram 50 mg oral tablet; Xifaxan 550 mg oral tablet         Allergy List  NO KNOWN DRUG ALLERGIES; NO KNOWN DRUG ALLERGIES       Allergies Reconciled  Family Medical History  Heart Disease; Diabetes, unspecified type         Social History  Alcohol (Former); Alcohol Use (Never); lives with spouse; .; Recreational Drug Use (Never); Retired.; Tobacco (Former)         Review of Systems  · Constitutional  o Denies  o : fever, chills, weight loss  · Cardiovascular  o Denies  o : chest pain, shortness of  "breath  · Gastrointestinal  o Denies  o : liver disease, heartburn, nausea, blood in stools  · Genitourinary  o Denies  o : painful urination, blood in urine  · Integument  o Denies  o : rash, itching  · Neurologic  o Denies  o : headache, weakness, loss of consciousness  · Musculoskeletal  o Denies  o : painful, swollen joints  · Psychiatric  o Denies  o : drug/alcohol addiction, anxiety, depression      Vitals  Date Time BP Position Site L\R Cuff Size HR RR TEMP (F) WT  HT  BMI kg/m2 BSA m2 O2 Sat        07/30/2020 03:09 PM      80 - R   221lbs 2oz 5'  9\" 32.65 2.21 98 %          Physical Examination  · Constitutional  o Appearance  o : well developed, well-nourished, no obvious deformities present  · Head and Face  o Head  o :   § Inspection  § : normocephalic  o Face  o :   § Inspection  § : no facial lesions  · Eyes  o Conjunctivae  o : conjunctivae normal  o Sclerae  o : sclerae white  · Ears, Nose, Mouth and Throat  o Ears  o :   § External Ears  § : appearance within normal limits  § Hearing  § : intact  o Nose  o :   § External Nose  § : appearance normal  · Neck  o Inspection/Palpation  o : normal appearance  o Range of Motion  o : full range of motion  · Respiratory  o Respiratory Effort  o : breathing unlabored  o Inspection of Chest  o : normal appearance  o Auscultation of Lungs  o : no audible wheezing or rales  · Cardiovascular  o Heart  o : regular rate  · Gastrointestinal  o Abdominal Examination  o : soft and non-tender  · Skin and Subcutaneous Tissue  o General Inspection  o : intact, no rashes  · Psychiatric  o General  o : Alert and oriented x3  o Judgement and Insight  o : judgment and insight intact  o Mood and Affect  o : mood normal, affect appropriate  · Left Knee  o Inspection  o : Tenderness palpation of the anterior knee, mild swelling to the knee, no red's, no heat, no signs of infection. Extension -10, flexion 100, stable anterior/posterior drawer, stable to varus/valgus stress. " Pain with range of motion.  · Imaging  o Imaging  o : X-ray left knee, 6/28/2020, conclusion:1. Linear lucency through the superolateral aspect of the patella likely representing a normal variant bipartite patella or less likely fracture given the lack of trauma and lack of joint effusion. 2. Evidence of prior ACL reconstruction. 3. Elongation of the inferior pole of the patella likely related to patellar tendinosis.           Assessment  · Left knee pain, unspecified chronicity     719.46/M25.562      Plan  · Medications  o Medications have been Reconciled  o Transition of Care or Provider Policy  · Instructions  o Reviewed the patient's Past Medical, Social, and Family history as well as the ROS at today's visit, no changes.  o Call or return if worsening symptoms.  o Follow up after MRI.  o Patient was advised that we will order MRI of the left knee without contrast for further evaluation. New prescription for tramadol given, short course, last time we will prescribe this for the patient. Follow-up after MRI.            Electronically Signed by: Ovidio Sapp PA-C -Author on July 30, 2020 05:04:15 PM

## 2021-05-14 VITALS — HEIGHT: 69 IN | BODY MASS INDEX: 32.88 KG/M2 | WEIGHT: 222 LBS | HEART RATE: 83 BPM | OXYGEN SATURATION: 98 %

## 2021-05-15 VITALS — BODY MASS INDEX: 32.22 KG/M2 | WEIGHT: 217.5 LBS | HEART RATE: 82 BPM | OXYGEN SATURATION: 98 % | HEIGHT: 69 IN

## 2021-05-15 VITALS — WEIGHT: 190 LBS | BODY MASS INDEX: 28.14 KG/M2 | HEIGHT: 69 IN

## 2021-05-15 VITALS — RESPIRATION RATE: 12 BRPM | BODY MASS INDEX: 27.25 KG/M2 | HEIGHT: 69 IN | WEIGHT: 184 LBS

## 2021-05-15 VITALS — OXYGEN SATURATION: 98 % | BODY MASS INDEX: 32.75 KG/M2 | WEIGHT: 221.12 LBS | HEART RATE: 80 BPM | HEIGHT: 69 IN

## 2021-05-16 VITALS — HEIGHT: 69 IN | WEIGHT: 210 LBS | BODY MASS INDEX: 31.1 KG/M2 | HEART RATE: 68 BPM | OXYGEN SATURATION: 99 %

## 2022-12-06 ENCOUNTER — OFFICE VISIT (OUTPATIENT)
Dept: PODIATRY | Facility: CLINIC | Age: 57
End: 2022-12-06

## 2022-12-06 VITALS
HEIGHT: 69 IN | OXYGEN SATURATION: 99 % | TEMPERATURE: 97.5 F | SYSTOLIC BLOOD PRESSURE: 129 MMHG | DIASTOLIC BLOOD PRESSURE: 72 MMHG | BODY MASS INDEX: 33.03 KG/M2 | WEIGHT: 223 LBS | HEART RATE: 84 BPM

## 2022-12-06 DIAGNOSIS — M25.572 CHRONIC PAIN OF BOTH ANKLES: ICD-10-CM

## 2022-12-06 DIAGNOSIS — M79.671 FOOT PAIN, BILATERAL: Primary | ICD-10-CM

## 2022-12-06 DIAGNOSIS — G62.1 ALCOHOLIC PERIPHERAL NEUROPATHY: ICD-10-CM

## 2022-12-06 DIAGNOSIS — M79.672 FOOT PAIN, BILATERAL: Primary | ICD-10-CM

## 2022-12-06 DIAGNOSIS — G89.29 CHRONIC PAIN OF BOTH ANKLES: ICD-10-CM

## 2022-12-06 DIAGNOSIS — M25.571 CHRONIC PAIN OF BOTH ANKLES: ICD-10-CM

## 2022-12-06 PROCEDURE — 20605 DRAIN/INJ JOINT/BURSA W/O US: CPT | Performed by: PODIATRIST

## 2022-12-06 PROCEDURE — 99203 OFFICE O/P NEW LOW 30 MIN: CPT | Performed by: PODIATRIST

## 2022-12-06 RX ORDER — DEXAMETHASONE SODIUM PHOSPHATE 4 MG/ML
2 INJECTION, SOLUTION INTRA-ARTICULAR; INTRALESIONAL; INTRAMUSCULAR; INTRAVENOUS; SOFT TISSUE ONCE
Status: COMPLETED | OUTPATIENT
Start: 2022-12-06 | End: 2022-12-06

## 2022-12-06 RX ORDER — BUPIVACAINE HYDROCHLORIDE 2.5 MG/ML
0.5 INJECTION, SOLUTION INFILTRATION; PERINEURAL ONCE
Status: COMPLETED | OUTPATIENT
Start: 2022-12-06 | End: 2022-12-06

## 2022-12-06 RX ADMIN — DEXAMETHASONE SODIUM PHOSPHATE 2 MG: 4 INJECTION, SOLUTION INTRA-ARTICULAR; INTRALESIONAL; INTRAMUSCULAR; INTRAVENOUS; SOFT TISSUE at 09:44

## 2022-12-06 RX ADMIN — BUPIVACAINE HYDROCHLORIDE 0.5 ML: 2.5 INJECTION, SOLUTION INFILTRATION; PERINEURAL at 09:42

## 2022-12-06 RX ADMIN — BUPIVACAINE HYDROCHLORIDE 0.5 ML: 2.5 INJECTION, SOLUTION INFILTRATION; PERINEURAL at 09:43

## 2022-12-06 RX ADMIN — DEXAMETHASONE SODIUM PHOSPHATE 2 MG: 4 INJECTION, SOLUTION INTRA-ARTICULAR; INTRALESIONAL; INTRAMUSCULAR; INTRAVENOUS; SOFT TISSUE at 09:43

## 2022-12-06 NOTE — PROGRESS NOTES
[facility] - PODIATRY    Today's Date: 22    Patient Name: Richard Patton  MRN: [id number]  CSN: [id number]  PCP: Tati Benton APRN  Referring Provider: Referring, Self    SUBJECTIVE     Chief Complaint   Patient presents with   • Left Foot - Establish Care, Pain     Last seen 2018  Last seen 2018     • Right Foot - Establish Care, Pain     Last seen 2018  Last seen 2018     • Left Ankle - Pain, Establish Care     Last seen 2018  Pt stated he got injections before    • Right Ankle - Pain, Establish Care     Last seen 2018  Pt stated he got injections before     HPI: Richard Patton, a 57 y.o.male, presents to clinic.    New, Established, New Problem: New    Location: Bilateral chronic ankle pain    Duration: Greater than 5 years    Onset: Insidious    Nature: Sore, painful achy ankles bilaterally    Stable, worsening, improving: Worsening    Aggravating factors:  Patient relates pain is aggravated by shoe gear and ambulation.      Previous Treatment: Injections in the past.  Charting shows last injection by Dr. Bruce was .    Patient denies any fevers, chills, nausea, vomiting, shortness of breath, nor any other constitutional signs nor symptoms.    Relates neuropathy symptoms.    Past Medical History:   Diagnosis Date   • Cirrhosis of liver (HCC)    • Foot pain, bilateral    • Recovering alcoholic (HCC)      Past Surgical History:   Procedure Laterality Date   • APPENDECTOMY     • CATARACT EXTRACTION, BILATERAL     • KNEE SURGERY       Family History   Problem Relation Age of Onset   • Heart disease Brother    • Diabetes Brother    • Heart attack Brother    • Stroke Neg Hx    • Cancer Neg Hx      Social History     Socioeconomic History   • Marital status:    Tobacco Use   • Smoking status: Former     Types: Cigarettes     Quit date:      Years since quittin.9   • Smokeless tobacco: Current     Types: Chew   Vaping Use   • Vaping Use: Never used    Substance and Sexual Activity   • Alcohol use: Not Currently     Comment: recovering alcoholic   • Drug use: Never   • Sexual activity: Defer     No Known Allergies  No current outpatient medications on file.     Current Facility-Administered Medications   Medication Dose Route Frequency Provider Last Rate Last Admin   • bupivacaine (MARCAINE) 0.25 % injection 0.5 mL  0.5 mL Injection Once Ethan Bruce DPM       • bupivacaine (MARCAINE) 0.25 % injection 0.5 mL  0.5 mL Injection Once Ethan Bruce DPM       • dexamethasone sodium phosphate injection 2 mg  2 mg Intramuscular Once Ethan Bruce DPM       • dexamethasone sodium phosphate injection 2 mg  2 mg Intramuscular Once Ethan Bruce DPM         Review of Systems   Musculoskeletal:        Ankle pain bilaterally   Neurological: Positive for numbness.   All other systems reviewed and are negative.      OBJECTIVE     Vitals:    12/06/22 0842   BP: 129/72   Pulse: 84   Temp: 97.5 °F (36.4 °C)   SpO2: 99%       WBC   Date Value Ref Range Status   08/24/2020 4.76 (L) 4.80 - 10.80 10*3/uL Final     RBC   Date Value Ref Range Status   08/24/2020 4.14 (L) 4.70 - 6.10 10*6/uL Final     Hemoglobin   Date Value Ref Range Status   08/24/2020 14.0 14.0 - 18.0 g/dL Final     Hematocrit   Date Value Ref Range Status   08/24/2020 41.1 (L) 42.0 - 52.0 % Final     MCV   Date Value Ref Range Status   08/24/2020 99.3 (H) 80.0 - 96.0 fL Final     MCH   Date Value Ref Range Status   08/24/2020 33.8 (H) 27.0 - 31.0 pg Final     MCHC   Date Value Ref Range Status   08/24/2020 34.1 33.0 - 37.0 Final     RDW   Date Value Ref Range Status   08/24/2020 13.7 11.6 - 14.4 % Final     RDW-SD   Date Value Ref Range Status   08/24/2020 49.8 (H) 35.1 - 43.9 fL Final     MPV   Date Value Ref Range Status   08/24/2020 10.6 9.4 - 12.4 fL Final     Platelets   Date Value Ref Range Status   08/24/2020 108 (L) 130 - 400 10*3/uL Final     Neutrophil Rel %   Date  Value Ref Range Status   08/24/2020 46.9 30.0 - 85.0 % Final     Lymphocyte Rel %   Date Value Ref Range Status   08/24/2020 36.8 20.0 - 45.0 % Final     Monocyte Rel %   Date Value Ref Range Status   08/24/2020 9.5 3.0 - 10.0 % Final     Eosinophil Rel %   Date Value Ref Range Status   08/24/2020 5.7 0.0 - 7.0 % Final     Basophil Rel %   Date Value Ref Range Status   08/24/2020 1.1 0.0 - 3.0 % Final     Neutrophils Absolute   Date Value Ref Range Status   08/24/2020 2.24 2.00 - 8.00 10*3/uL Final     Lymphocytes Absolute   Date Value Ref Range Status   08/24/2020 1.75 1.00 - 5.00 10*3/uL Final     Monocytes Absolute   Date Value Ref Range Status   08/24/2020 0.45 0.20 - 1.20 10*3/uL Final     Eosinophils Absolute   Date Value Ref Range Status   08/24/2020 0.27 0.00 - 0.70 10*3/uL Final     Basophils Absolute   Date Value Ref Range Status   08/24/2020 0.05 0.00 - 0.20 10*3/uL Final     NRBC   Date Value Ref Range Status   08/24/2020 0.00 0.00 - 0.70 % Final         Lab Results   Component Value Date    GLUCOSE 88 08/24/2020    BUN 7 08/24/2020    CREATININE 1.08 08/24/2020    BCR 6 08/24/2020    K 4.0 08/24/2020    CO2 26 08/24/2020    CALCIUM 10.3 08/24/2020    ALBUMIN 3.4 (L) 08/24/2020    LABIL2 1.1 (L) 08/24/2020    AST 34 08/24/2020    ALT 19 08/24/2020       Patient seen in no apparent distress.      PHYSICAL EXAM:     Foot/Ankle Exam:       General:   Appearance comment:  Chronically ill  Orientation: AAOx3    Affect: appropriate    Gait: unimpaired    Shoe Gear:  Casual shoes    VASCULAR      Right Foot Vascularity   Normal vascular exam    Dorsalis pedis:  1+  Posterior tibial:  1+  Skin Temperature: warm    Edema Grading:  None  CFT:  < 3 seconds  Pedal Hair Growth:  Absent  Varicosities: mild varicosities       Left Foot Vascularity   Normal vascular exam    Dorsalis pedis:  1+  Posterior tibial:  1+  Skin Temperature: warm    Edema Grading:  None  CFT:  < 3 seconds  Pedal Hair Growth:   Absent  Varicosities: mild varicosities        NEUROLOGIC     Right Foot Neurologic   Light touch sensation:  Diminished  Vibratory sensation:  Diminished  Hot/Cold sensation: diminished    Protective Sensation using [place] Monofilament:  3     Left Foot Neurologic   Light touch sensation:  Diminished  Vibratory sensation:  Diminished  Hot/cold sensation: diminished    Protective Sensation using [place] Monofilament:  3     MUSCULOSKELETAL      Right Foot Musculoskeletal   Tenderness comment:  Ankle.  No signs of edema, erythema, lymphangitis, nor signs of infection.       Left Foot Musculoskeletal   Tenderness comment:  Ankle.  No signs of edema, erythema, lymphangitis, nor signs of infection.       MUSCLE STRENGTH     Right Foot Muscle Strength   Foot dorsiflexion:  4  Foot plantar flexion:  4  Foot inversion:  4  Foot eversion:  4     Left Foot Muscle Strength   Foot dorsiflexion:  4  Foot plantar flexion:  4  Foot inversion:  4  Foot eversion:  4     RANGE OF MOTION      Right Foot Range of Motion   Foot and ankle ROM within normal limits    ankle dorsiflexion pain    plantar flexion pain       Left Foot Range of Motion   Foot and ankle ROM within normal limits    active ankle dorsiflexion pain    active plantar flexion pain       DERMATOLOGIC     Right Foot Dermatologic   Skin: skin intact    Nails: normal       Left Foot Dermatologic   Skin: skin intact    Nails: normal        ASSESSMENT/PLAN     Diagnoses and all orders for this visit:    1. Foot pain, bilateral (Primary)    2. Chronic pain of both ankles    3. Alcoholic peripheral neuropathy (HCC)  -     bupivacaine (MARCAINE) 0.25 % injection 0.5 mL  -     dexamethasone sodium phosphate injection 2 mg  -     bupivacaine (MARCAINE) 0.25 % injection 0.5 mL  -     dexamethasone sodium phosphate injection 2 mg    Injection  Date/Time: 12/06/2022  Performed by: Ethan Bruce DPM  Authorized by: Ethan Bruce DPM     Consent: Verbal  consent obtained. Written consent obtained.  Risks and benefits: risks, benefits and alternatives were discussed  Consent given by: patient  Patient identity confirmed: verbally with patient  Indications: pain relief    Betadine prep x 3 to the planned injection site.    Injection site: Ankle bilaterally    Sedation:  Patient sedated: no    Patient position: sitting  Needle size: 27 G  Local anesthetic: 1.0 ml 0.25% Marcaine plain and 1.0 ml Decadron 4 mg/mL.   Outcome: pain improved  Patient tolerance: Patient tolerated the procedure well with no immediate complications      Comprehensive lower extremity examination and evaluation was performed.    Discussed findings and treatment plan including risks, benefits, and treatment options with patient in detail. Patient agreed with treatment plan.    Medications and allergies reviewed.  Reviewed available lab values along with other pertinent labs.  These were discussed with the patient.    An After Visit Summary was printed and given to the patient at discharge, including (if requested) any available informative/educational handouts regarding diagnosis, treatment, or medications. All questions were answered to patient/family satisfaction. Should symptoms fail to improve or worsen they agree to call or return to clinic or to go to the Emergency Department. Discussed the importance of following up with any needed screening tests/labs/specialist appointments and any requested follow-up recommended by me today. Importance of maintaining follow-up discussed and patient accepts that missed appointments can delay diagnosis and potentially lead to worsening of conditions.    Return if symptoms worsen or fail to improve., or sooner if acute issues arise.    This document has been electronically signed by Ethan Bruce DPM on December 6, 2022 09:38 EST

## 2025-04-14 ENCOUNTER — OFFICE VISIT (OUTPATIENT)
Dept: NEUROLOGY | Facility: CLINIC | Age: 60
End: 2025-04-14
Payer: MEDICARE

## 2025-04-14 VITALS
HEART RATE: 84 BPM | WEIGHT: 245 LBS | BODY MASS INDEX: 36.29 KG/M2 | HEIGHT: 69 IN | SYSTOLIC BLOOD PRESSURE: 154 MMHG | DIASTOLIC BLOOD PRESSURE: 70 MMHG

## 2025-04-14 DIAGNOSIS — G56.22 ULNAR NEUROPATHY AT ELBOW, LEFT: Primary | ICD-10-CM

## 2025-04-14 PROCEDURE — 99203 OFFICE O/P NEW LOW 30 MIN: CPT | Performed by: PSYCHIATRY & NEUROLOGY

## 2025-04-14 NOTE — PROGRESS NOTES
"Chief Complaint  Numbness (LUE NUMBNESS. )    Subjective          Richard Patton is a 59 y.o. male who presents to [facility]  History of Present Illness      History of Present Illness  The patient presents for evaluation of numbness in his hand.    He has been experiencing persistent numbness in 3 of his fingers for a minimum of 3 years. The numbness is not limited to his fingers but extends to his hand and arm, causing significant discomfort when he lies on it or keeps it in a downward position for an extended period. He also reports pain in his elbow, which intensifies upon leaning on it, and a shooting pain that radiates to his fingers. Additionally, he experiences tingling sensations in his fingers. His symptoms are not limited to nighttime; they persist throughout the day.      Objective   Vital Signs:   /70   Pulse 84   Ht 175.3 cm (69.02\")   Wt 111 kg (245 lb)   BMI 36.16 kg/m²     Physical Exam   There is no weakness of the upper extremities on individual muscle testing.  Specifically there is no weakness of ulnar innervated muscles on the left hand.  Sensation is decreased to pinprick in the division of the ulnar nerve on the left hand.  Flexors are absent in biceps, triceps, brachioradialis.  Range of motion of the arm is normal.     Results           Assessment and Plan  Diagnoses and all orders for this visit:    1. Ulnar neuropathy at elbow, left (Primary)         Assessment & Plan  1. Cubital tunnel syndrome.  He reports numbness in his hand and fingers for at least 3 years, with pain exacerbated by leaning on his elbow. The numbness primarily affects the pinky and ring fingers, consistent with ulnar nerve involvement. There is no significant muscle weakness, indicating the nerve is not severely damaged. He is advised to wear an elbow splint at night for the next 4 weeks to prevent bending the elbow and allow the nerve to heal. He should also keep " his elbow straight during the day when sitting. If there is no improvement after 4 weeks, a nerve study will be ordered.    Follow-up  The patient will follow up in 2 months.      Total time spent with the patient and coordinating patient care was 30 minutes.    Follow Up  No follow-ups on file.  Patient was given instructions and counseling regarding his condition or for health maintenance advice. Please see specific information pulled into the AVS if appropriate.     Patient or patient representative verbalized consent for the use of Ambient Listening during the visit with  Jose Enrique Arriaza MD for chart documentation. 4/14/2025  14:09 EDT